# Patient Record
Sex: FEMALE | Race: BLACK OR AFRICAN AMERICAN | URBAN - METROPOLITAN AREA
[De-identification: names, ages, dates, MRNs, and addresses within clinical notes are randomized per-mention and may not be internally consistent; named-entity substitution may affect disease eponyms.]

---

## 2021-01-05 ENCOUNTER — OFFICE VISIT (OUTPATIENT)
Dept: URBAN - METROPOLITAN AREA CLINIC 82 | Facility: CLINIC | Age: 51
End: 2021-01-05
Payer: OTHER GOVERNMENT

## 2021-01-05 ENCOUNTER — LAB OUTSIDE AN ENCOUNTER (OUTPATIENT)
Dept: URBAN - METROPOLITAN AREA CLINIC 82 | Facility: CLINIC | Age: 51
End: 2021-01-05

## 2021-01-05 VITALS
WEIGHT: 230 LBS | TEMPERATURE: 97.4 F | DIASTOLIC BLOOD PRESSURE: 85 MMHG | HEART RATE: 66 BPM | BODY MASS INDEX: 38.32 KG/M2 | SYSTOLIC BLOOD PRESSURE: 132 MMHG | RESPIRATION RATE: 14 BRPM | HEIGHT: 65 IN

## 2021-01-05 DIAGNOSIS — Z79.1 NSAID LONG-TERM USE: ICD-10-CM

## 2021-01-05 DIAGNOSIS — Z80.0 FAMILY HISTORY OF GASTRIC CANCER: ICD-10-CM

## 2021-01-05 DIAGNOSIS — R14.0 BLOATING: ICD-10-CM

## 2021-01-05 DIAGNOSIS — D25.9 UTERINE FIBROID: ICD-10-CM

## 2021-01-05 DIAGNOSIS — R68.81 EARLY SATIETY: ICD-10-CM

## 2021-01-05 PROBLEM — 95315005 UTERINE FIBROID: Status: ACTIVE | Noted: 2021-01-05

## 2021-01-05 PROBLEM — 133621000119103 LONG TERM CURRENT USE OF NON-STEROIDAL ANTI-INFLAMMATORY DRUG: Status: ACTIVE | Noted: 2021-01-05

## 2021-01-05 PROBLEM — 442076002 EARLY SATIETY: Status: ACTIVE | Noted: 2021-01-05

## 2021-01-05 PROBLEM — 429006005 FAMILY HISTORY OF MALIGNANT NEOPLASM OF GASTROINTESTINAL TRACT: Status: ACTIVE | Noted: 2021-01-05

## 2021-01-05 PROBLEM — 60728008 BLOATING: Status: ACTIVE | Noted: 2021-01-05

## 2021-01-05 PROCEDURE — G8484 FLU IMMUNIZE NO ADMIN: HCPCS | Performed by: INTERNAL MEDICINE

## 2021-01-05 PROCEDURE — 99243 OFF/OP CNSLTJ NEW/EST LOW 30: CPT | Performed by: INTERNAL MEDICINE

## 2021-01-05 PROCEDURE — 99203 OFFICE O/P NEW LOW 30 MIN: CPT | Performed by: INTERNAL MEDICINE

## 2021-01-05 NOTE — HPI-TODAY'S VISIT:
extended belly, get full quickly. This is a 50-year-old pleasant -American female who came here with the chief complaint of bloating easily food going on for last 2 years in the past she had passed out and she went to [place] emergency room they did a CT and blood test this was 2 years back and she was told to have fibroid she do not know what size is it.  She has occasional nausea but no vomiting.  She is moving bowel movements good.  She denies any bright red blood per rectum or any black stool or any NSAIDs.  She has been using chronic Goody powder for her migraine.  She she has tried different medication for migraine but she cannot afford that.  She never had colonoscopy.

## 2021-01-06 ENCOUNTER — TELEPHONE ENCOUNTER (OUTPATIENT)
Dept: URBAN - METROPOLITAN AREA CLINIC 92 | Facility: CLINIC | Age: 51
End: 2021-01-06

## 2021-01-06 LAB
A/G RATIO: 1.2
ALBUMIN: 4.4
ALKALINE PHOSPHATASE: 77
ALT (SGPT): 8
AMYLASE: 33
AST (SGOT): 12
BASO (ABSOLUTE): 0.1
BASOS: 1
BILIRUBIN, TOTAL: 0.2
BUN/CREATININE RATIO: 14
BUN: 13
CALCIUM: 9.3
CARBON DIOXIDE, TOTAL: 26
CHLORIDE: 103
CREATININE: 0.92
EGFR IF AFRICN AM: 84
EGFR IF NONAFRICN AM: 73
EOS (ABSOLUTE): 0.1
EOS: 1
GLOBULIN, TOTAL: 3.7
GLUCOSE: 69
HEMATOCRIT: 38.7
HEMATOLOGY COMMENTS:: (no result)
HEMOGLOBIN: 12.8
IMMATURE CELLS: (no result)
IMMATURE GRANS (ABS): 0
IMMATURE GRANULOCYTES: 0
LIPASE: 30
LYMPHS (ABSOLUTE): 2.6
LYMPHS: 31
MCH: 30
MCHC: 33.1
MCV: 91
MONOCYTES(ABSOLUTE): 0.7
MONOCYTES: 8
NEUTROPHILS (ABSOLUTE): 4.9
NEUTROPHILS: 59
NRBC: (no result)
PLATELETS: 251
POTASSIUM: 4
PROTEIN, TOTAL: 8.1
RBC: 4.26
RDW: 13.5
SODIUM: 139
WBC: 8.3

## 2021-02-03 ENCOUNTER — OFFICE VISIT (OUTPATIENT)
Dept: URBAN - METROPOLITAN AREA SURGERY CENTER 13 | Facility: SURGERY CENTER | Age: 51
End: 2021-02-03
Payer: OTHER GOVERNMENT

## 2021-02-03 DIAGNOSIS — R68.81 EARLY SATIETY: ICD-10-CM

## 2021-02-03 DIAGNOSIS — Z85.028 HISTORY OF GASTRIC CANCER: ICD-10-CM

## 2021-02-03 DIAGNOSIS — K29.60 ADENOPAPILLOMATOSIS GASTRICA: ICD-10-CM

## 2021-02-03 PROCEDURE — 43239 EGD BIOPSY SINGLE/MULTIPLE: CPT | Performed by: INTERNAL MEDICINE

## 2021-02-03 PROCEDURE — G8907 PT DOC NO EVENTS ON DISCHARG: HCPCS | Performed by: INTERNAL MEDICINE

## 2023-09-26 ENCOUNTER — LAB OUTSIDE AN ENCOUNTER (OUTPATIENT)
Dept: URBAN - METROPOLITAN AREA CLINIC 74 | Facility: CLINIC | Age: 53
End: 2023-09-26

## 2023-09-26 ENCOUNTER — OFFICE VISIT (OUTPATIENT)
Dept: URBAN - METROPOLITAN AREA CLINIC 74 | Facility: CLINIC | Age: 53
End: 2023-09-26
Payer: OTHER GOVERNMENT

## 2023-09-26 ENCOUNTER — DASHBOARD ENCOUNTERS (OUTPATIENT)
Age: 53
End: 2023-09-26

## 2023-09-26 VITALS
BODY MASS INDEX: 38.65 KG/M2 | WEIGHT: 232 LBS | TEMPERATURE: 96.8 F | SYSTOLIC BLOOD PRESSURE: 119 MMHG | DIASTOLIC BLOOD PRESSURE: 78 MMHG | HEART RATE: 59 BPM | HEIGHT: 65 IN

## 2023-09-26 DIAGNOSIS — Z79.1 NSAID LONG-TERM USE: ICD-10-CM

## 2023-09-26 DIAGNOSIS — Z12.11 COLON CANCER SCREENING: ICD-10-CM

## 2023-09-26 DIAGNOSIS — R14.0 BLOATING: ICD-10-CM

## 2023-09-26 DIAGNOSIS — Z80.0 FAMILY HISTORY OF GASTRIC CANCER: ICD-10-CM

## 2023-09-26 DIAGNOSIS — K59.01 SLOW TRANSIT CONSTIPATION: ICD-10-CM

## 2023-09-26 PROBLEM — 35298007: Status: ACTIVE | Noted: 2023-09-26

## 2023-09-26 PROCEDURE — 99203 OFFICE O/P NEW LOW 30 MIN: CPT | Performed by: NURSE PRACTITIONER

## 2023-09-26 NOTE — HPI-TODAY'S VISIT:
53-year-old female patient with past medical history as listed below, new to me and this clinic, previously seen by Dr. Block January 5, 2021 for early satiety, bloating, uterine fibroid, family history of gastric cancer, long-term NSAID use, amylase and lipase and CMP and CBC were ordered as well as EGD with biopsy, she had been taking chronic Goody powder use for migraines.  EGD showed entire esophagus was normal, diffuse mild inflammation found in the gastric antrum, biopsies were taken with a cold forceps for H. pylori testing, otherwise without abnormality biopsies showed chronic gastritis, no H. pylori on the antrum stomach biopsy.  -- The patient presents today to schedule colonoscopy as she has never had one.   Family history of is not entirely certain, her birth mother had some  sort of cancer but not certain what kind it was. Her brother had "stomach cancer".    She states that she is still taking Goody powders very frequently, she will take Nexium as needed, she has some  heartburn type symptoms worse at night.  States she has a lately been constipated and bloated a little more.  She says her stools have been a little darker the past couple of weeks and hard.  She has tried to increase her water intake.  She will have a bowel movement every 1 to 2 days, denies any hematochezia.  Denies any abdominal pain.  Had lengthy discussion about the use of Goody powders with the patient and the risks.  Also discussed ways to help with heartburn and reflux symptoms.  Medical alert questionnaire reviewed.

## 2023-10-19 ENCOUNTER — OFFICE VISIT (OUTPATIENT)
Dept: URBAN - METROPOLITAN AREA SURGERY CENTER 30 | Facility: SURGERY CENTER | Age: 53
End: 2023-10-19

## 2023-11-03 ENCOUNTER — OUT OF OFFICE VISIT (OUTPATIENT)
Dept: URBAN - METROPOLITAN AREA SURGERY CENTER 30 | Facility: SURGERY CENTER | Age: 53
End: 2023-11-03
Payer: OTHER GOVERNMENT

## 2023-11-03 ENCOUNTER — CLAIMS CREATED FROM THE CLAIM WINDOW (OUTPATIENT)
Dept: URBAN - METROPOLITAN AREA CLINIC 4 | Facility: CLINIC | Age: 53
End: 2023-11-03
Payer: OTHER GOVERNMENT

## 2023-11-03 DIAGNOSIS — K63.89 OTHER SPECIFIED DISEASES OF INTESTINE: ICD-10-CM

## 2023-11-03 DIAGNOSIS — Z12.11 COLON CANCER SCREENING: ICD-10-CM

## 2023-11-03 DIAGNOSIS — K64.8 OTHER HEMORRHOIDS: ICD-10-CM

## 2023-11-03 DIAGNOSIS — K63.5 BENIGN COLON POLYP: ICD-10-CM

## 2023-11-03 DIAGNOSIS — K64.4 PERIANAL SKIN TAGS: ICD-10-CM

## 2023-11-03 DIAGNOSIS — K63.5 COLONIC POLYPS: ICD-10-CM

## 2023-11-03 PROCEDURE — 45380 COLONOSCOPY AND BIOPSY: CPT | Performed by: INTERNAL MEDICINE

## 2023-11-03 PROCEDURE — 00811 ANES LWR INTST NDSC NOS: CPT | Performed by: NURSE ANESTHETIST, CERTIFIED REGISTERED

## 2023-11-03 PROCEDURE — 88305 TISSUE EXAM BY PATHOLOGIST: CPT | Performed by: PATHOLOGY

## 2023-11-03 PROCEDURE — G8907 PT DOC NO EVENTS ON DISCHARG: HCPCS | Performed by: INTERNAL MEDICINE

## 2024-02-06 NOTE — PHYSICAL EXAM GASTROINTESTINAL
Abdomen , soft, nontender, nondistended , no guarding or rigidity , no masses palpable , normal bowel sounds , Liver and Spleen,  no hepatosplenomegaly , liver nontender PROGRESS NOTE     Subjective     Ivet is a 27 year old here for No chief complaint on file.   Heavy cramping    Review of Systems  {Select ROS or Use NoteWriter Above:[id number]}    SDOH Former Smoker       Objective {Show More Vitals   :3}  There were no vitals filed for this visit.  Physical Exam  {Select Exam or use NoteWriter Above :[id number]}         ASSESSMENT AND PLAN  {TIP! Problems (2)  Medications (4)  Dispense Hx:3}      {There are no diagnoses linked to this encounter. (Refresh or delete this SmartLink)}    Follow Up